# Patient Record
Sex: MALE | Race: BLACK OR AFRICAN AMERICAN | Employment: FULL TIME | URBAN - METROPOLITAN AREA
[De-identification: names, ages, dates, MRNs, and addresses within clinical notes are randomized per-mention and may not be internally consistent; named-entity substitution may affect disease eponyms.]

---

## 2017-03-28 ENCOUNTER — HOSPITAL ENCOUNTER (EMERGENCY)
Facility: CLINIC | Age: 42
Discharge: HOME | End: 2017-03-28
Attending: EMERGENCY MEDICINE | Admitting: EMERGENCY MEDICINE

## 2017-03-28 VITALS
OXYGEN SATURATION: 93 % | SYSTOLIC BLOOD PRESSURE: 151 MMHG | HEART RATE: 110 BPM | RESPIRATION RATE: 16 BRPM | TEMPERATURE: 100.9 F | DIASTOLIC BLOOD PRESSURE: 99 MMHG

## 2017-03-28 DIAGNOSIS — J11.1 FLU: ICD-10-CM

## 2017-03-28 DIAGNOSIS — J11.1 INFLUENZA-LIKE ILLNESS: ICD-10-CM

## 2017-03-28 LAB
FLUAV+FLUBV AG SPEC QL: ABNORMAL
FLUAV+FLUBV AG SPEC QL: NEGATIVE
SPECIMEN SOURCE: ABNORMAL

## 2017-03-28 PROCEDURE — 99283 EMERGENCY DEPT VISIT LOW MDM: CPT | Mod: Z6 | Performed by: EMERGENCY MEDICINE

## 2017-03-28 PROCEDURE — 99283 EMERGENCY DEPT VISIT LOW MDM: CPT | Performed by: EMERGENCY MEDICINE

## 2017-03-28 PROCEDURE — 87804 INFLUENZA ASSAY W/OPTIC: CPT | Performed by: EMERGENCY MEDICINE

## 2017-03-28 RX ORDER — OSELTAMIVIR PHOSPHATE 75 MG/1
75 CAPSULE ORAL 2 TIMES DAILY
Qty: 10 CAPSULE | Refills: 0 | Status: SHIPPED | OUTPATIENT
Start: 2017-03-28 | End: 2017-04-02

## 2017-03-28 NOTE — ED AVS SNAPSHOT
[facility], Emergency Department    [address]    [facility] [address]    Phone:  [phone]    Fax:  [phone]                                       Cameron Benson   MRN: [id number]    Department:  [facility], Emergency Department   Date of Visit:  3/28/2017           Patient Information     Date Of Birth          1975        Your diagnoses for this visit were:     Influenza-like illness        You were seen by Kali Phillips MD.        Discharge Instructions         Influenza  Influenza ( the flu ) is an infection that affects your respiratory tract (the mouth, nose, and lungs, and the passages between them). Unlike a cold, the flu can make you very ill. And it can lead to pneumonia, a serious lung infection. For some people, especially older adults, young children, and people with certain chronic conditions, the flu can have serious complications and even be fatal.  What Are the Risk Factors for the Flu?     Viruses that cause influenza spread through the air in droplets when someone who has the flu coughs, sneezes, laughs, or talks.   Anyone can get the flu. But you re more likely to become infected if you:    Have a weakened immune system.    Work in a health care setting where you may be exposed to flu germs.    Live or work with someone who has the flu.    Haven t received an annual flu shot.  How Does the Flu Spread?  The flu is caused by viruses. The viruses spread through the air in droplets when someone who has the flu coughs, sneezes, laughs, or talks. You can become infected when you inhale these viruses directly. You can also become infected when you touch a surface on which the droplets have landed and then transfer the germs to your eyes, nose, or mouth. Touching used tissues, or sharing utensils, drinking glasses, or a toothbrush with an infected person can expose you to flu viruses, too.  What Are the Symptoms of the Flu?  Flu symptoms tend to come on quickly and may  last a few days to a few weeks. They include:    Fever usually higher than 101 F  (38.3 C) and chills    Sore throat and headache    Dry cough    Runny nose    Tiredness and weakness    Muscle aches  Factors That Can Make Flu Worse  For some people, the flu can be very serious. The risk of complications is greater for:    Children under age 5.    Adults 65 years of age and older.    People with a chronic illness, such as diabetes or heart, kidney, or lung disease.    People who live in a nursing home or long-term care facility.   How Is the Flu Treated?  Influenza usually improves after 7 days or so. In some cases, your health care provider may prescribe an antiviral medication. This may help you get well sooner. For the medication to help, you need to take it as soon as possible (ideally within 48 hours) after your symptoms start. If you develop pneumonia or other serious illness, hospital care may be needed.  Easing Flu Symptoms    Drink lots of fluids such as water, juice, and warm soup. A good rule is to drink enough so that you urinate your normal amount.    Get plenty of rest.    Ask your health care provider what to take for fever and pain.    Call your provider if your fever rises over 101 F (38.3 C) or you become dizzy, lightheaded, or short of breath.  Taking Steps to Protect Others    Wash your hands often, especially after coughing or sneezing. Or, clean your hands with an alcohol-based hand  containing at least 60 percent alcohol.    Cough or sneeze into a tissue. Then throw the tissue away and wash your hands. If you don t have a tissue, cough and sneeze into the crook of your elbow.    Stay home until at least 24 hours after you no longer have a fever or chills. Be sure the fever isn t being hidden by fever-reducing medication.    Don t share food, utensils, drinking glasses, or a toothbrush with others.    Ask your health care provider if others in your household should receive antiviral  medication to help them avoid infection.  How Can the Flu Be Prevented?    One of the best ways to avoid the flu is to get a flu vaccination each year. Viruses that cause the flu change from year to year. For that reason, doctors recommend getting the flu vaccine each year, as soon as it's available in your area. The vaccine may be given as a shot or as a nasal spray. Your health care provider can tell you which vaccine is right for you.    Wash your hands often. Frequent handwashing is a proven way to help prevent infection.    Carry an alcohol-based hand gel containing at least 60 percent alcohol. Use it when you don t have access to soap and water. Then wash your hands as soon as you can.    Avoid touching your eyes, nose, and mouth.    At home and work, clean phones, computer keyboards, and toys often with disinfectant wipes.    If possible, avoid close contact with others who have the flu or symptoms of the flu.  Handwashing Tips  Handwashing is one of the best ways to prevent many common infections. If you re caring for or visiting someone with the flu, wash your hands each time you enter and leave the room. Follow these steps:    Use warm water and plenty of soap. Rub your hands together well.    Clean the whole hand, under your nails, between your fingers, and up the wrists.    Wash for at least 15 seconds.    Rinse, letting the water run down your fingers, not up your wrists.    Dry your hands well. Use a paper towel to turn off the faucet and open the door.  Using Alcohol-Based Hand   Alcohol-based hand  are also a good choice. Use them when you don t have access to soap and water. Follow these steps:    Squeeze about a tablespoon of gel into the palm of one hand.    Rub your hands together briskly, cleaning the backs of your hands, the palms, between your fingers, and up the wrists.    Rub until the gel is gone and your hands are completely dry.  Preventing Influenza in Healthcare  Settings  The flu is a special concern for people in hospitals and long-term care facilities. To help prevent the spread of flu, many hospitals and nursing homes take these steps:    Health care providers wash their hands or use an alcohol-based hand  before and after treating each patient.    People with the flu have private rooms and bathrooms or share a room with someone with the same infection.    High-risk patients who don t have the flu are encouraged to get the flu and pneumonia vaccines.    All health care workers are encouraged or required to get flu shots.        5448-3384 [employer] [address] [place] [address]. All rights reserved. This information is not intended as a substitute for professional medical care. Always follow your healthcare professional's instructions.          24 Hour Appointment Hotline       To make an appointment at any [facility], call [phone] ([phone]). If you don't have a family doctor or clinic, we will help you find one. [facility] clinics are conveniently located to serve the needs of you and your family.             Review of your medicines      START taking        Dose / Directions Last dose taken    oseltamivir 75 MG capsule   Commonly known as:  TAMIFLU   Dose:  75 mg   Quantity:  10 capsule        Take 1 capsule (75 mg) by mouth 2 times daily for 5 days   Refills:  0                Prescriptions were sent or printed at these locations (1 Prescription)                   Other Prescriptions                Printed at Department/Unit printer (1 of 1)         oseltamivir (TAMIFLU) 75 MG capsule                Procedures and tests performed during your visit     Influenza A/B antigen swab      Orders Needing Specimen Collection     None      Pending Results     Date and Time Order Name Status Description    3/28/2017 0231 Influenza A/B antigen swab In process             Pending Culture Results     Date and Time Order Name  "Status Description    3/28/2017 0231 Influenza A/B antigen swab In process             Thank you for choosing [facility]       Thank you for choosing [facility] for your care. Our goal is always to provide you with excellent care. Hearing back from our patients is one way we can continue to improve our services. Please take a few minutes to complete the written survey that you may receive in the mail after you visit with us. Thank you!        [employer] Information     [employer] lets you send messages to your doctor, view your test results, renew your prescriptions, schedule appointments and more. To sign up, go to [email or web address] . Click on \"Log in\" on the left side of the screen, which will take you to the Welcome page. Then click on \"Sign up Now\" on the right side of the page.     You will be asked to enter the access code listed below, as well as some personal information. Please follow the directions to create your username and password.     Your access code is: [email or web address]  Expires: 2017  2:57 AM     Your access code will  in 90 days. If you need help or a new code, please call your [facility] clinic or [phone].        Care EveryWhere ID     This is your Care EveryWhere ID. This could be used by other organizations to access your [facility] medical records  [id number]        After Visit Summary       This is your record. Keep this with you and show to your community pharmacist(s) and doctor(s) at your next visit.                  "

## 2017-03-28 NOTE — ED AVS SNAPSHOT
[facility], [place], Emergency Department    [address]    [facility] [address]    Phone:  [phone]    Fax:  [phone]                                       Cameron Benson   MRN: [id number]    Department:  [facility], Emergency Department   Date of Visit:  3/28/2017           After Visit Summary Signature Page     I have received my discharge instructions, and my questions have been answered. I have discussed any challenges I see with this plan with the nurse or doctor.    ..........................................................................................................................................  Patient/Patient Representative Signature      ..........................................................................................................................................  Patient Representative Print Name and Relationship to Patient    ..................................................               ................................................  Date                                            Time    ..........................................................................................................................................  Reviewed by Signature/Title    ...................................................              ..............................................  Date                                                            Time

## 2017-03-28 NOTE — DISCHARGE INSTRUCTIONS
Influenza  Influenza ( the flu ) is an infection that affects your respiratory tract (the mouth, nose, and lungs, and the passages between them). Unlike a cold, the flu can make you very ill. And it can lead to pneumonia, a serious lung infection. For some people, especially older adults, young children, and people with certain chronic conditions, the flu can have serious complications and even be fatal.  What Are the Risk Factors for the Flu?     Viruses that cause influenza spread through the air in droplets when someone who has the flu coughs, sneezes, laughs, or talks.   Anyone can get the flu. But you re more likely to become infected if you:    Have a weakened immune system.    Work in a health care setting where you may be exposed to flu germs.    Live or work with someone who has the flu.    Haven t received an annual flu shot.  How Does the Flu Spread?  The flu is caused by viruses. The viruses spread through the air in droplets when someone who has the flu coughs, sneezes, laughs, or talks. You can become infected when you inhale these viruses directly. You can also become infected when you touch a surface on which the droplets have landed and then transfer the germs to your eyes, nose, or mouth. Touching used tissues, or sharing utensils, drinking glasses, or a toothbrush with an infected person can expose you to flu viruses, too.  What Are the Symptoms of the Flu?  Flu symptoms tend to come on quickly and may last a few days to a few weeks. They include:    Fever usually higher than 101 F  (38.3 C) and chills    Sore throat and headache    Dry cough    Runny nose    Tiredness and weakness    Muscle aches  Factors That Can Make Flu Worse  For some people, the flu can be very serious. The risk of complications is greater for:    Children under age 5.    Adults 65 years of age and older.    People with a chronic illness, such as diabetes or heart, kidney, or lung disease.    People who live in a nursing  home or long-term care facility.   How Is the Flu Treated?  Influenza usually improves after 7 days or so. In some cases, your health care provider may prescribe an antiviral medication. This may help you get well sooner. For the medication to help, you need to take it as soon as possible (ideally within 48 hours) after your symptoms start. If you develop pneumonia or other serious illness, hospital care may be needed.  Easing Flu Symptoms    Drink lots of fluids such as water, juice, and warm soup. A good rule is to drink enough so that you urinate your normal amount.    Get plenty of rest.    Ask your health care provider what to take for fever and pain.    Call your provider if your fever rises over 101 F (38.3 C) or you become dizzy, lightheaded, or short of breath.  Taking Steps to Protect Others    Wash your hands often, especially after coughing or sneezing. Or, clean your hands with an alcohol-based hand  containing at least 60 percent alcohol.    Cough or sneeze into a tissue. Then throw the tissue away and wash your hands. If you don t have a tissue, cough and sneeze into the crook of your elbow.    Stay home until at least 24 hours after you no longer have a fever or chills. Be sure the fever isn t being hidden by fever-reducing medication.    Don t share food, utensils, drinking glasses, or a toothbrush with others.    Ask your health care provider if others in your household should receive antiviral medication to help them avoid infection.  How Can the Flu Be Prevented?    One of the best ways to avoid the flu is to get a flu vaccination each year. Viruses that cause the flu change from year to year. For that reason, doctors recommend getting the flu vaccine each year, as soon as it's available in your area. The vaccine may be given as a shot or as a nasal spray. Your health care provider can tell you which vaccine is right for you.    Wash your hands often. Frequent handwashing is a proven way  to help prevent infection.    Carry an alcohol-based hand gel containing at least 60 percent alcohol. Use it when you don t have access to soap and water. Then wash your hands as soon as you can.    Avoid touching your eyes, nose, and mouth.    At home and work, clean phones, computer keyboards, and toys often with disinfectant wipes.    If possible, avoid close contact with others who have the flu or symptoms of the flu.  Handwashing Tips  Handwashing is one of the best ways to prevent many common infections. If you re caring for or visiting someone with the flu, wash your hands each time you enter and leave the room. Follow these steps:    Use warm water and plenty of soap. Rub your hands together well.    Clean the whole hand, under your nails, between your fingers, and up the wrists.    Wash for at least 15 seconds.    Rinse, letting the water run down your fingers, not up your wrists.    Dry your hands well. Use a paper towel to turn off the faucet and open the door.  Using Alcohol-Based Hand   Alcohol-based hand  are also a good choice. Use them when you don t have access to soap and water. Follow these steps:    Squeeze about a tablespoon of gel into the palm of one hand.    Rub your hands together briskly, cleaning the backs of your hands, the palms, between your fingers, and up the wrists.    Rub until the gel is gone and your hands are completely dry.  Preventing Influenza in Healthcare Settings  The flu is a special concern for people in hospitals and long-term care facilities. To help prevent the spread of flu, many hospitals and nursing homes take these steps:    Health care providers wash their hands or use an alcohol-based hand  before and after treating each patient.    People with the flu have private rooms and bathrooms or share a room with someone with the same infection.    High-risk patients who don t have the flu are encouraged to get the flu and pneumonia  vaccines.    All health care workers are encouraged or required to get flu shots.        5303-5191 [employer]. [address] [place] [address], [place] [address]. All rights reserved. This information is not intended as a substitute for professional medical care. Always follow your healthcare professional's instructions.

## 2017-05-18 NOTE — ED PROVIDER NOTES
History     Chief Complaint   Patient presents with     Cold Symptoms     Has been feeling like this since Friday.  Bodyaches. Took Tylenol Saturday with no relief.     HPI  Cameron Benson is a 42 year old male who presents with cold symptoms for the past 2 days.  He has had congestion, cough and body aches.  He tried taking some tylenol with no relief.  He has no vomiting or diarrhea.  He has no fever or chills.  No sick contacts or recent travel.      PAST MEDICAL HISTORY: History reviewed. No pertinent past medical history.    PAST SURGICAL HISTORY: History reviewed. No pertinent surgical history.    FAMILY HISTORY: No family history on file.    SOCIAL HISTORY:   Social History   Substance Use Topics     Smoking status: Current Every Day Smoker     Packs/day: 0.50     Smokeless tobacco: Not on file     Alcohol use No       Discharge Medication List as of 3/28/2017  2:57 AM      START taking these medications    Details   oseltamivir (TAMIFLU) 75 MG capsule Take 1 capsule (75 mg) by mouth 2 times daily for 5 days, Disp-10 capsule, R-0, Local Print              No Known Allergies      I have reviewed the Medications, Allergies, Past Medical and Surgical History, and Social History in the Epic system.    Review of Systems   All other systems reviewed and are negative.      Physical Exam   BP: 132/84  Pulse: 112  Temp: 99.6  F (37.6  C)  Resp: 18  SpO2: 96 %  Physical Exam   Constitutional: He is oriented to person, place, and time. No distress.   HENT:   Head: Normocephalic and atraumatic.   Right Ear: External ear normal.   Left Ear: External ear normal.   Mouth/Throat: No oropharyngeal exudate.   Eyes: Conjunctivae are normal. Pupils are equal, round, and reactive to light.   Neck: Normal range of motion. Neck supple.   Cardiovascular: Intact distal pulses.    tachycardic   Pulmonary/Chest: Effort normal. No respiratory distress. He has no wheezes. He has no rales. He exhibits no tenderness.   Abdominal: Soft.  There is no tenderness. There is no rebound and no guarding.   Musculoskeletal: Normal range of motion.   Neurological: He is alert and oriented to person, place, and time. He exhibits normal muscle tone.   Skin: Skin is warm and dry. No rash noted. He is not diaphoretic.   Psychiatric: He has a normal mood and affect. His behavior is normal. Thought content normal.   Nursing note and vitals reviewed.      ED Course     ED Course     Procedures             Critical Care time:  none               Labs Ordered and Resulted from Time of ED Arrival Up to the Time of Departure from the ED   INFLUENZA A/B ANTIGEN - Abnormal; Notable for the following:        Result Value    Influenza B   (*)     Value: Positive   Test results must be correlated with clinical data. If necessary, results   should be confirmed by a molecular assay or viral culture.      All other components within normal limits            Assessments & Plan (with Medical Decision Making)   1.  Influenza B    41 yo M with cold congestion and body aches.  Influenza B positive.  Non-toxic appearing.  Tamiflu and PCP follow up.  Ibuprofen or tylenol for pain or fever.     I have reviewed the nursing notes.    I have reviewed the findings, diagnosis, plan and need for follow up with the patient.    Discharge Medication List as of 3/28/2017  2:57 AM      START taking these medications    Details   oseltamivir (TAMIFLU) 75 MG capsule Take 1 capsule (75 mg) by mouth 2 times daily for 5 days, Disp-10 capsule, R-0, Local Print             Final diagnoses:   Influenza-like illness       3/28/2017   [facility], EMERGENCY DEPARTMENT     Kali Phillips MD  05/17/17 3321

## 2018-11-23 ENCOUNTER — HOSPITAL ENCOUNTER (EMERGENCY)
Facility: CLINIC | Age: 43
Discharge: HOME | End: 2018-11-23
Attending: FAMILY MEDICINE | Admitting: FAMILY MEDICINE
Payer: COMMERCIAL

## 2018-11-23 VITALS
SYSTOLIC BLOOD PRESSURE: 145 MMHG | DIASTOLIC BLOOD PRESSURE: 90 MMHG | RESPIRATION RATE: 16 BRPM | WEIGHT: 208 LBS | OXYGEN SATURATION: 93 % | TEMPERATURE: 98.5 F

## 2018-11-23 DIAGNOSIS — J06.9 VIRAL URI WITH COUGH: ICD-10-CM

## 2018-11-23 LAB
DEPRECATED S PYO AG THROAT QL EIA: NORMAL
DEPRECATED S PYO AG THROAT QL EIA: NORMAL
SPECIMEN SOURCE: NORMAL

## 2018-11-23 PROCEDURE — 99283 EMERGENCY DEPT VISIT LOW MDM: CPT | Mod: GC | Performed by: FAMILY MEDICINE

## 2018-11-23 PROCEDURE — 87081 CULTURE SCREEN ONLY: CPT | Performed by: FAMILY MEDICINE

## 2018-11-23 PROCEDURE — 87880 STREP A ASSAY W/OPTIC: CPT | Performed by: FAMILY MEDICINE

## 2018-11-23 PROCEDURE — 99283 EMERGENCY DEPT VISIT LOW MDM: CPT | Performed by: FAMILY MEDICINE

## 2018-11-23 NOTE — ED PROVIDER NOTES
History     Chief Complaint   Patient presents with     Pharyngitis     sore throat with nasal congestion and headache x 2 weeks     HPI  Cameron Benson is a 43 year old male with no significant PMH who is presenting for several days of sore throat and headache. He reports over a week ago he experienced URI symptoms such as runny nose and cough. These symptoms resolved in a few day, but a sore throat has remained for several days. He has used tylenol which helps. He currently is not experiencing fevers, N/V, constipation, diarrhea, shortness of breath, runny nose, decreased PO intake, or body aches.     I have reviewed the Medications, Allergies, Past Medical and Surgical History, and Social History in the Epic system.    Review of Systems: 10 pt ROS negative except as above    Physical Exam   BP: 131/90  Heart Rate: 96  Temp: 98.5  F (36.9  C)  Resp: 16  Weight: 94.3 kg (208 lb)  SpO2: 100 %      Physical Exam   General: alert, awake, sitting in bed in no acute distress  HEENT: NC/AT, no rhinorrhea or congestion, no conjunctival injection or scleral icterus, no oral lesions. No pharyngeal exudates.   Cardiovascular: RRR, no m/g/r  Respiratory: Lungs CTAB, no wheezing or crackles  Abdominal: Soft, non-tender, non-distended  Extremities: Atraumatic, well-perfused, no edema  Skin: No rashes, lesions, or jaundice  Neuro: Alert and oriented, CN II-XII grossly intact, moving all four extremities    ED Course     ED Course     Procedures    Recent Results (from the past 24 hour(s))   Rapid strep screen    Collection Time: 11/23/18  4:39 PM   Result Value Ref Range    Specimen Description Throat     Rapid Strep A Screen       NEGATIVE: No Group A streptococcal antigen detected by immunoassay, await culture report.    Rapid Strep A Screen Internal QC OK         Critical Care time:  none    Assessments & Plan (with Medical Decision Making)   Cameron Benson is a 43 year old male with no significant PMH who is presenting for  several days of sore throat and headache after having URI symptoms last week. Initial differential included viral URI, post-nasal drip, strep throat, influenza. Patient was afebrile and non-toxic appearing, and he had no pharyngeal exudates or lung crackles concerning for pneumonia. Rapid strep negative. Most likely diagnosis at this point is residual sore throat due to viral URI. Patient was discharged with URI information and conservative management options for sore throat.     I have reviewed the nursing notes.    I have reviewed the findings, diagnosis, plan and need for follow up with the patient.    New Prescriptions    No medications on file       Final diagnoses:   Viral URI with cough   I agree with finding and plan. I saw the pt independently of Resident Dr Mena and we discussed plan  Jordan Sierra MD  Psychiatry PGY-1    11/23/2018   [facility], [place], EMERGENCY DEPARTMENT     Braxton Quintana MD  11/28/18 0592

## 2018-11-23 NOTE — DISCHARGE INSTRUCTIONS
Viral Upper Respiratory Illness (Adult)  You have a viral upper respiratory illness (URI), which is another term for the common cold. This illness is contagious during the first few days. It is spread through the air by coughing and sneezing. It may also be spread by direct contact (touching the sick person and then touching your own eyes, nose, or mouth). Frequent handwashing will decrease risk of spread. Most viral illnesses go away within 7 to 10 days with rest and simple home remedies. Sometimes the illness may last for several weeks. Antibiotics will not kill a virus, and they are generally not prescribed for this condition.    Home care    If symptoms are severe, rest at home for the first 2 to 3 days. When you resume activity, don't let yourself get too tired.    Don't smoke. If you need help stopping, talk with your healthcare provider.    Avoid being exposed to cigarette smoke (yours or others ).    You may use acetaminophen or ibuprofen to control pain and fever, unless another medicine was prescribed. If you have chronic liver or kidney disease, have ever had a stomach ulcer or gastrointestinal bleeding, or are taking blood-thinning medicines, talk with your healthcare provider before using these medicines. Aspirin should never be given to anyone under 18 years of age who is ill with a viral infection or fever. It may cause severe liver or brain damage.    Your appetite may be poor, so a light diet is fine. Stay well hydrated by drinking 6 to 8 glasses of fluids per day (water, soft drinks, juices, tea, or soup). Extra fluids will help loosen secretions in the nose and lungs.    Over-the-counter cold medicines will not shorten the length of time you re sick, but they may be helpful for the following symptoms: cough, sore throat, and nasal and sinus congestion. If you take prescription medicines, ask your healthcare provider or pharmacist which over-the-counter medicines are safe to use. (Note: Don't use  decongestants if you have high blood pressure.)  Follow-up care  Follow up with your healthcare provider, or as advised.  When to seek medical advice  Call your healthcare provider right away if any of these occur:    Cough with lots of colored sputum (mucus)    Severe headache; face, neck, or ear pain    Difficulty swallowing due to throat pain    Fever of 100.4 F (38 C) or higher, or as directed by your healthcare provider  Call 911  Call 911 if any of these occur:    Chest pain, shortness of breath, wheezing, or difficulty breathing    Coughing up blood    Very severe pain with swallowing, especially if it goes along with a muffled voice     For Sore Throat:  There are several options for treating sore throat:  - Saltwater gargle (1/4 tsp salt in 4 oz water)  - Honey in tea  - Cough drops/lozenges    Date Last Reviewed: 6/1/2018 2000-2018 [employer]. [address] [place] [address]. All rights reserved. This information is not intended as a substitute for professional medical care. Always follow your healthcare professional's instructions.

## 2018-11-23 NOTE — ED AVS SNAPSHOT
[facility], Emergency Department    [address]    [facility] [place] [address]    Phone:  [phone]    Fax:  [phone]                                       Cameron Benson   MRN: [id number]    Department:  [facility], Emergency Department   Date of Visit:  11/23/2018           Patient Information     Date Of Birth          1975        Your diagnoses for this visit were:     Viral URI with cough        You were seen by Braxton Quintana MD.        Discharge Instructions         Viral Upper Respiratory Illness (Adult)  You have a viral upper respiratory illness (URI), which is another term for the common cold. This illness is contagious during the first few days. It is spread through the air by coughing and sneezing. It may also be spread by direct contact (touching the sick person and then touching your own eyes, nose, or mouth). Frequent handwashing will decrease risk of spread. Most viral illnesses go away within 7 to 10 days with rest and simple home remedies. Sometimes the illness may last for several weeks. Antibiotics will not kill a virus, and they are generally not prescribed for this condition.    Home care    If symptoms are severe, rest at home for the first 2 to 3 days. When you resume activity, don't let yourself get too tired.    Don't smoke. If you need help stopping, talk with your healthcare provider.    Avoid being exposed to cigarette smoke (yours or others ).    You may use acetaminophen or ibuprofen to control pain and fever, unless another medicine was prescribed. If you have chronic liver or kidney disease, have ever had a stomach ulcer or gastrointestinal bleeding, or are taking blood-thinning medicines, talk with your healthcare provider before using these medicines. Aspirin should never be given to anyone under 18 years of age who is ill with a viral infection or fever. It may cause severe liver or brain damage.    Your appetite may be poor, so a light diet is fine. Stay well  hydrated by drinking 6 to 8 glasses of fluids per day (water, soft drinks, juices, tea, or soup). Extra fluids will help loosen secretions in the nose and lungs.    Over-the-counter cold medicines will not shorten the length of time you re sick, but they may be helpful for the following symptoms: cough, sore throat, and nasal and sinus congestion. If you take prescription medicines, ask your healthcare provider or pharmacist which over-the-counter medicines are safe to use. (Note: Don't use decongestants if you have high blood pressure.)  Follow-up care  Follow up with your healthcare provider, or as advised.  When to seek medical advice  Call your healthcare provider right away if any of these occur:    Cough with lots of colored sputum (mucus)    Severe headache; face, neck, or ear pain    Difficulty swallowing due to throat pain    Fever of 100.4 F (38 C) or higher, or as directed by your healthcare provider  Call 911  Call 911 if any of these occur:    Chest pain, shortness of breath, wheezing, or difficulty breathing    Coughing up blood    Very severe pain with swallowing, especially if it goes along with a muffled voice     For Sore Throat:  There are several options for treating sore throat:  - Saltwater gargle (1/4 tsp salt in 4 oz water)  - Honey in tea  - Cough drops/lozenges    Date Last Reviewed: 6/1/2018 2000-2018 [employer]. [address] [place] [address]. All rights reserved. This information is not intended as a substitute for professional medical care. Always follow your healthcare professional's instructions.          24 Hour Appointment Hotline       To make an appointment at any [facility] clinic, call [phone] ([phone]). If you don't have a family doctor or clinic, we will help you find one. [facility] clinics are conveniently located to serve the needs of you and your family.             Review of your medicines      Our records show that you are taking  "the medicines listed below. If these are incorrect, please call your family doctor or clinic.        Dose / Directions Last dose taken    TYLENOL PO        Refills:  0                Orders Needing Specimen Collection     None      Pending Results     No orders found from 2018 to 2018.            Pending Culture Results     No orders found from 2018 to 2018.            Pending Results Instructions     If you had any lab results that were not finalized at the time of your Discharge, you can call the ED Lab Result RN at [phone]. You will be contacted by this team for any positive Lab results or changes in treatment. The nurses are available 7 days a week from 10A to 6:30P.  You can leave a message 24 hours per day and they will return your call.        Thank you for choosing [facility]       Thank you for choosing [facility] for your care. Our goal is always to provide you with excellent care. Hearing back from our patients is one way we can continue to improve our services. Please take a few minutes to complete the written survey that you may receive in the mail after you visit with us. Thank you!        Funding Profiles Information     [employer] Profiles lets you send messages to your doctor, view your test results, renew your prescriptions, schedule appointments and more. To sign up, go to [email or web address] . Click on \"Log in\" on the left side of the screen, which will take you to the Welcome page. Then click on \"Sign up Now\" on the right side of the page.     You will be asked to enter the access code listed below, as well as some personal information. Please follow the directions to create your username and password.     Your access code is: [email or web address]  Expires: 2019  4:23 PM     Your access code will  in 90 days. If you need help or a new code, please call your [facility] clinic or [phone].        Care EveryWhere ID     This is your Care EveryWhere ID. This could be used by other " organizations to access your [facility] medical records  [id number]        Equal Access to Services     CHER ASHLEY : Rashel Nicole, neema cabral, kiah shoemaker. So [facility] [phone].    ATENCIÓN: Si habla español, tiene a burgos disposición servicios gratuitos de asistencia lingüística. Llame al [phone].    We comply with applicable federal civil rights laws and [place] laws. We do not discriminate on the basis of race, color, national origin, age, disability, sex, sexual orientation, or gender identity.            After Visit Summary       This is your record. Keep this with you and show to your community pharmacist(s) and doctor(s) at your next visit.

## 2018-11-23 NOTE — ED AVS SNAPSHOT
[facility], [place], Emergency Department    [address]    [facility] [address]    Phone:  [phone]    Fax:  [phone]                                       Cameron Benson   MRN: [id number]    Department:  [facility], Emergency Department   Date of Visit:  11/23/2018           After Visit Summary Signature Page     I have received my discharge instructions, and my questions have been answered. I have discussed any challenges I see with this plan with the nurse or doctor.    ..........................................................................................................................................  Patient/Patient Representative Signature      ..........................................................................................................................................  Patient Representative Print Name and Relationship to Patient    ..................................................               ................................................  Date                                   Time    ..........................................................................................................................................  Reviewed by Signature/Title    ...................................................              ..............................................  Date                                               Time          22EPIC Rev 08/18

## 2018-11-25 LAB
BACTERIA SPEC CULT: NORMAL
Lab: NORMAL
SPECIMEN SOURCE: NORMAL

## 2019-09-20 ENCOUNTER — HOSPITAL ENCOUNTER (EMERGENCY)
Facility: CLINIC | Age: 44
Discharge: HOME | End: 2019-09-20
Attending: EMERGENCY MEDICINE | Admitting: EMERGENCY MEDICINE
Payer: COMMERCIAL

## 2019-09-20 ENCOUNTER — APPOINTMENT (OUTPATIENT)
Dept: GENERAL RADIOLOGY | Facility: CLINIC | Age: 44
End: 2019-09-20
Attending: FAMILY MEDICINE
Payer: COMMERCIAL

## 2019-09-20 VITALS
SYSTOLIC BLOOD PRESSURE: 174 MMHG | DIASTOLIC BLOOD PRESSURE: 98 MMHG | TEMPERATURE: 97.9 F | HEART RATE: 82 BPM | OXYGEN SATURATION: 100 % | RESPIRATION RATE: 22 BRPM

## 2019-09-20 DIAGNOSIS — S22.32XA CLOSED FRACTURE OF ONE RIB OF LEFT SIDE, INITIAL ENCOUNTER: ICD-10-CM

## 2019-09-20 PROCEDURE — 71046 X-RAY EXAM CHEST 2 VIEWS: CPT

## 2019-09-20 PROCEDURE — 99283 EMERGENCY DEPT VISIT LOW MDM: CPT | Mod: 25 | Performed by: EMERGENCY MEDICINE

## 2019-09-20 PROCEDURE — 99283 EMERGENCY DEPT VISIT LOW MDM: CPT | Mod: Z6 | Performed by: EMERGENCY MEDICINE

## 2019-09-20 PROCEDURE — 25000132 ZZH RX MED GY IP 250 OP 250 PS 637: Performed by: EMERGENCY MEDICINE

## 2019-09-20 RX ORDER — IBUPROFEN 800 MG/1
800 TABLET, FILM COATED ORAL EVERY 8 HOURS PRN
Qty: 60 TABLET | Refills: 0 | Status: SHIPPED | OUTPATIENT
Start: 2019-09-20 | End: 2019-09-28

## 2019-09-20 RX ORDER — OXYCODONE AND ACETAMINOPHEN 5; 325 MG/1; MG/1
1 TABLET ORAL EVERY 6 HOURS PRN
Qty: 12 TABLET | Refills: 0 | Status: SHIPPED | OUTPATIENT
Start: 2019-09-20

## 2019-09-20 RX ORDER — IBUPROFEN 600 MG/1
600 TABLET, FILM COATED ORAL ONCE
Status: COMPLETED | OUTPATIENT
Start: 2019-09-20 | End: 2019-09-20

## 2019-09-20 RX ORDER — ACETAMINOPHEN 325 MG/1
975 TABLET ORAL ONCE
Status: COMPLETED | OUTPATIENT
Start: 2019-09-20 | End: 2019-09-20

## 2019-09-20 RX ADMIN — ACETAMINOPHEN 975 MG: 325 TABLET, FILM COATED ORAL at 21:08

## 2019-09-20 RX ADMIN — IBUPROFEN 600 MG: 600 TABLET ORAL at 21:08
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
